# Patient Record
Sex: MALE | HISPANIC OR LATINO | URBAN - NONMETROPOLITAN AREA
[De-identification: names, ages, dates, MRNs, and addresses within clinical notes are randomized per-mention and may not be internally consistent; named-entity substitution may affect disease eponyms.]

---

## 2020-02-05 ENCOUNTER — OFFICE VISIT (OUTPATIENT)
Dept: URBAN - NONMETROPOLITAN AREA CLINIC 18 | Facility: CLINIC | Age: 35
End: 2020-02-05
Payer: COMMERCIAL

## 2020-02-05 DIAGNOSIS — H53.022 REFRACTIVE AMBLYOPIA, LEFT EYE: ICD-10-CM

## 2020-02-05 DIAGNOSIS — H52.223 REGULAR ASTIGMATISM, BILATERAL: Primary | ICD-10-CM

## 2020-02-05 PROCEDURE — 92004 COMPRE OPH EXAM NEW PT 1/>: CPT | Performed by: OPTOMETRIST

## 2020-02-05 ASSESSMENT — VISUAL ACUITY
OD: 20/20
OS: 20/70

## 2020-02-05 ASSESSMENT — INTRAOCULAR PRESSURE
OS: 14
OD: 14

## 2020-02-05 NOTE — IMPRESSION/PLAN
Impression: Refractive amblyopia, left eye: H53.022. Plan: Patient educated to reason for decreased vision. Will prescribe corrective lenses to help improve the patient's visual potential. Discussed polycarbonate lens options.

## 2020-05-04 ENCOUNTER — OFFICE VISIT (OUTPATIENT)
Dept: URBAN - NONMETROPOLITAN AREA CLINIC 18 | Facility: CLINIC | Age: 35
End: 2020-05-04

## 2020-05-04 PROCEDURE — V2799 MISC VISION ITEM OR SERVICE: HCPCS | Performed by: OPTOMETRIST

## 2020-05-04 ASSESSMENT — VISUAL ACUITY
OS: 20/60
OD: 20/20

## 2020-05-04 NOTE — IMPRESSION/PLAN
Impression: Regular astigmatism, bilateral: H52.223. Plan: Discussed status. Poor adaptation to prior glasses. Reviewed Rx and material (polycarbonate benefits and potential adaptation challenges discussed). New Rx today. RTC if any new problems experienced.

## 2022-12-13 ENCOUNTER — OFFICE VISIT (OUTPATIENT)
Dept: URBAN - NONMETROPOLITAN AREA CLINIC 18 | Facility: CLINIC | Age: 37
End: 2022-12-13
Payer: COMMERCIAL

## 2022-12-13 DIAGNOSIS — H52.223 REGULAR ASTIGMATISM, BILATERAL: ICD-10-CM

## 2022-12-13 DIAGNOSIS — H53.022 REFRACTIVE AMBLYOPIA, LEFT EYE: Primary | ICD-10-CM

## 2022-12-13 PROCEDURE — 99213 OFFICE O/P EST LOW 20 MIN: CPT | Performed by: OPTOMETRIST

## 2022-12-13 ASSESSMENT — INTRAOCULAR PRESSURE
OD: 16
OS: 16

## 2022-12-13 NOTE — IMPRESSION/PLAN
Impression: Refractive amblyopia, left eye: H53.022. Plan: Patient educated to reason for decreased vision.   Will prescribe corrective lenses to help improve the patient's visual potential.